# Patient Record
Sex: FEMALE | Race: WHITE | Employment: FULL TIME | URBAN - METROPOLITAN AREA
[De-identification: names, ages, dates, MRNs, and addresses within clinical notes are randomized per-mention and may not be internally consistent; named-entity substitution may affect disease eponyms.]

---

## 2020-12-10 RX ORDER — FLUTICASONE FUROATE AND VILANTEROL TRIFENATATE 200; 25 UG/1; UG/1
POWDER RESPIRATORY (INHALATION)
COMMUNITY
Start: 2020-11-16

## 2020-12-10 RX ORDER — LEVOTHYROXINE SODIUM 75 UG/1
TABLET ORAL
COMMUNITY
Start: 2020-10-16

## 2020-12-11 ENCOUNTER — OFFICE VISIT (OUTPATIENT)
Dept: NEUROLOGY | Age: 60
End: 2020-12-11
Payer: COMMERCIAL

## 2020-12-11 VITALS
HEART RATE: 70 BPM | DIASTOLIC BLOOD PRESSURE: 60 MMHG | RESPIRATION RATE: 16 BRPM | WEIGHT: 123 LBS | OXYGEN SATURATION: 99 % | TEMPERATURE: 97.9 F | SYSTOLIC BLOOD PRESSURE: 102 MMHG

## 2020-12-11 DIAGNOSIS — R29.898 WEAKNESS OF BOTH LEGS: Primary | ICD-10-CM

## 2020-12-11 PROCEDURE — 99204 OFFICE O/P NEW MOD 45 MIN: CPT | Performed by: PSYCHIATRY & NEUROLOGY

## 2020-12-11 NOTE — LETTER
12/11/20 Patient: Lucretia Davison YOB: 1960 Date of Visit: 12/11/2020 Tasha Hill MD 
[address] [place] [id number] VIA Facsimile: [phone] Dear Tasha Hill MD, Thank you for referring Ms. Lucretia Davison to [facility] for evaluation. My notes for this consultation are attached. If you have questions, please do not hesitate to call me. I look forward to following your patient along with you. Sincerely, Alexis Hernandez MD

## 2020-12-11 NOTE — PROGRESS NOTES
Chief Complaint   Patient presents with    New Patient     C/o bilateral leg pain, all day, alot of chevy horses. Also has had 3 falls and states she is basically tripping herself because her legs either give out or just feel heavy.      Visit Vitals  /60 (BP 1 Location: Right arm, BP Patient Position: Sitting)   Pulse 70   Temp 97.9 °F (36.6 °C)   Resp 16   Wt 55.8 kg (123 lb)   SpO2 99%

## 2020-12-11 NOTE — PROGRESS NOTES
NEUROLOGY NEW PATIENT OFFICE CONSULTATION      12/11/2020    RE: Andrew Rivas         1960      REFERRED BY:  Kassy Mancera MD        CHIEF COMPLAINT:  This is Andrew Rivas is a 64 y.o. female right handed works for [employer] who had concerns including New Patient (C/o bilateral leg pain, all day, alot of chevy horses. Also has had 3 falls and states she is basically tripping herself because her legs either give out or just feel heavy.). HPI:     For the past 1 yr, patient noted progressive bilateral leg pain and weakness all day, described as chevy horses or cramps of the calves and the feet, more so at night, will get tripped, legs feel tired, symmetric    (-) incontinence  (-) neck pain  (-) lower back pain  (-) numbness    Recent tests done:  Vit B12 203  CRP WNL  ESR 3  CAROLYN (-)  CBC and CMP WNL      ROS   (-) fever  (-) rash  All other systems reviewed and are negative    Past Medical Hx  Past Medical History:   Diagnosis Date    Asthma        Social Hx  Social History     Socioeconomic History    Marital status:      Spouse name: Not on file    Number of children: Not on file    Years of education: Not on file    Highest education level: Not on file   Tobacco Use    Smoking status: Never Smoker    Smokeless tobacco: Never Used       Family Hx  History reviewed. No pertinent family history. ALLERGIES  No Known Allergies    CURRENT MEDS  Current Outpatient Medications   Medication Sig Dispense Refill    Breo Ellipta 200-25 mcg/dose inhaler INL 1 PUFF PO D      levothyroxine (SYNTHROID) 75 mcg tablet              PREVIOUS WORKUP: (reviewed)  IMAGING:    CT Results (recent):  No results found for this or any previous visit. MRI Results (recent):  No results found for this or any previous visit. IR Results (recent):  No results found for this or any previous visit. VAS/US Results (recent):  No results found for this or any previous visit.         LABS (reviewed)  No results found for this or any previous visit. Physical Exam:     Visit Vitals  /60 (BP 1 Location: Right arm, BP Patient Position: Sitting)   Pulse 70   Temp 97.9 °F (36.6 °C)   Resp 16   Wt 55.8 kg (123 lb)   SpO2 99%     General:  Alert, cooperative, no distress. Head:  Normocephalic, without obvious abnormality, atraumatic. Eyes:  Conjunctivae/corneas clear. Lungs:  Heart:   Non labored breathing  Regular rate and rhythm, no carotid bruits   Abdomen:   Soft, non-distended   Extremities: Extremities normal, atraumatic, no cyanosis or edema. Pulses: 2+ and symmetric all extremities. Skin: Skin color, texture, turgor normal. No rashes or lesions. Neurologic Exam     Gen: Attention normal             Language: naming, repetition, fluency normal             Memory: intact recent and remote memory  Cranial Nerves:  I: smell Not tested   II: visual fields Full to confrontation   II: pupils Equal, round, reactive to light   II: optic disc No papilledema   III,VII: ptosis none   III,IV,VI: extraocular muscles  Full ROM   V: mastication normal   V: facial light touch sensation  normal   VII: facial muscle function   symmetric   VIII: hearing symmetric   IX: soft palate elevation  normal   XI: trapezius strength  5/5   XI: sternocleidomastoid strength 5/5   XI: neck flexion strength  5/5   XII: tongue  midline     Motor: normal bulk and tone, no tremor              Strength: 5/5 all four extremities  Sensory: intact to LT, PP, vibration, and JPS  Reflexes: 2+ throughout; Down going toes  Coordination: Good FTN and HTS  Gait: normal gait including tandem            Impression:     Gabriella Harrison is a 64 y.o. female who  has a past medical history of Asthma. who for the past 1 yr, noted progressive bilateral leg pain and weakness all day, described as chevy horses or cramps of the calves and the feet, more so at night, will get tripped, legs feel tired, symmetric.  Considerations include myelopathy due to Vit B12 deficiency (203) and spinal stenosis. RECOMMENDATIONS  1. I had a long discussion with patient. Discussed diagnosis, prognosis, pathophysiology and available treatment. Reviewed test results. All questions were answered. 2. Reviewed lab results from outside  3. Advise to talk to her PCP and start Vit B12 IM injection every day for 7 days, then Q weekly with goal to get Vit B12 level in the 900 to 1000 range and see if symptoms will improve  4. Patient will do MRI lumbar spine if still no improvement despite above  5. Depending on above, will consider EMG/NCS and physical therapy referral        Follow-up and Dispositions    · Return in about 1 month (around 1/11/2021).             Thank you for the consultation      Kamini Yo MD  Diplomate, American Board of Psychiatry and Neurology  Diplomate, Neuromuscular Medicine  Diplomate, American Board of Electrodiagnostic Medicine        CC: Fredi Keene MD  Fax: [phone]

## 2021-02-19 ENCOUNTER — OFFICE VISIT (OUTPATIENT)
Dept: NEUROLOGY | Age: 61
End: 2021-02-19
Payer: COMMERCIAL

## 2021-02-19 VITALS
DIASTOLIC BLOOD PRESSURE: 70 MMHG | TEMPERATURE: 98.7 F | RESPIRATION RATE: 16 BRPM | HEART RATE: 80 BPM | SYSTOLIC BLOOD PRESSURE: 132 MMHG | OXYGEN SATURATION: 99 % | WEIGHT: 123 LBS

## 2021-02-19 DIAGNOSIS — R29.898 WEAKNESS OF BOTH LEGS: Primary | ICD-10-CM

## 2021-02-19 PROCEDURE — 99214 OFFICE O/P EST MOD 30 MIN: CPT | Performed by: PSYCHIATRY & NEUROLOGY

## 2021-02-19 RX ORDER — CYANOCOBALAMIN 1000 UG/ML
INJECTION, SOLUTION INTRAMUSCULAR; SUBCUTANEOUS
COMMUNITY
Start: 2021-02-16

## 2021-02-19 NOTE — LETTER
2/19/2021    Patient: Robinson Barrios   YOB: 1960   Date of Visit: 2/19/2021     Judith Easley MD  [facility] [phone]  Via Fax: [phone]    Dear Judith Easley MD,      Thank you for referring Ms. Robinson Barrios to [facility] for evaluation. My notes for this consultation are attached. If you have questions, please do not hesitate to call me. I look forward to following your patient along with you.       Sincerely,    Charissa Peterson MD

## 2021-02-19 NOTE — PROGRESS NOTES
Neurology Progress Note    Patient ID:  Shilpa Granados  [id number]  3 y.o.  1960      Subjective:   History:  Shilpa Granados is a 64 y.o. female who  has a past medical history of Asthma. who for the past 1 yr, noted progressive bilateral leg pain and weakness all day, described as chevy horses or cramps of the calves and the feet, more so at night, will get tripped, legs feel tired, symmetric. Considerations include myelopathy due to Vit B12 deficiency (203) and spinal stenosis.     Had COVID in Dec and was not able to start Vit B12 until 1 month ago. Getting now weekly Vit B12 injections with 75% improvement with note of more energy and decrease chevy horse in both feet. ROS:  Per HPI-  Otherwise the remainder of ROS was negative    Social Hx  Social History     Socioeconomic History    Marital status:      Spouse name: Not on file    Number of children: Not on file    Years of education: Not on file    Highest education level: Not on file   Tobacco Use    Smoking status: Never Smoker    Smokeless tobacco: Never Used       Meds:  Current Outpatient Medications on File Prior to Visit   Medication Sig Dispense Refill    cyanocobalamin (VITAMIN B12) 1,000 mcg/mL injection       Breo Ellipta 200-25 mcg/dose inhaler INL 1 PUFF PO D      levothyroxine (SYNTHROID) 75 mcg tablet        No current facility-administered medications on file prior to visit. Imaging:    CT Results (recent):  No results found for this or any previous visit. MRI Results (recent):  No results found for this or any previous visit. IR Results (recent):  No results found for this or any previous visit. VAS/US Results (recent):  No results found for this or any previous visit. Reviewed records in [facility] tab today    Lab Review     No visits with results within 3 Month(s) from this visit. Latest known visit with results is:   No results found for any previous visit.          Objective: Exam:  Visit Vitals  /70 (BP 1 Location: Right arm, BP Patient Position: Sitting, BP Cuff Size: Adult)   Pulse 80   Temp 98.7 °F (37.1 °C) (Temporal)   Resp 16   Wt 55.8 kg (123 lb)   SpO2 99%     Gen: Awake, alert, follows commands  Appropriate appearance, normal speech. Oriented to all spheres. No visual field defect on confrontation exam.  Full eyes movement, with no nystagmus, no diplopia, no ptosis. Normal gag and swallow. All remaining cranial nerves were normal  Motor function: 5/5 in all extremities  Sensory: intact to LT, PP and JPS  DTRs ++ in all extremities, (-) Babinski  Good FTN and HTS   Gait: Normal    Assessment:       ICD-10-CM ICD-9-CM    1. Weakness of both legs  R29.898 729.89      Due to Vit B12 deficiency      Plan:   1. Since patient feels 75% improved, will monitor symptoms for now  2. Continue to keep Vit B12 levels in the high normal range (900-1000) c/o PCP. 3. Follow up Vit B12 levels  4. Will consider doing MRI lumbar spine and EMG/NCS depending on above      Follow-up and Dispositions    · Return if symptoms worsen or fail to improve.                Klever Guo MD  Diplomate, American Board of Psychiatry and Neurology  Diplomate, Neuromuscular Medicine  Diplomate, American Board of Electrodiagnostic Medicine

## 2022-02-22 ENCOUNTER — OFFICE VISIT (OUTPATIENT)
Dept: NEUROLOGY | Age: 62
End: 2022-02-22
Payer: COMMERCIAL

## 2022-02-22 VITALS
RESPIRATION RATE: 18 BRPM | WEIGHT: 118.7 LBS | OXYGEN SATURATION: 95 % | TEMPERATURE: 97 F | HEART RATE: 62 BPM | DIASTOLIC BLOOD PRESSURE: 72 MMHG | SYSTOLIC BLOOD PRESSURE: 110 MMHG

## 2022-02-22 DIAGNOSIS — M54.41 CHRONIC BILATERAL LOW BACK PAIN WITH BILATERAL SCIATICA: Primary | ICD-10-CM

## 2022-02-22 DIAGNOSIS — G89.29 CHRONIC BILATERAL LOW BACK PAIN WITH BILATERAL SCIATICA: Primary | ICD-10-CM

## 2022-02-22 DIAGNOSIS — M54.42 CHRONIC BILATERAL LOW BACK PAIN WITH BILATERAL SCIATICA: Primary | ICD-10-CM

## 2022-02-22 PROCEDURE — 99214 OFFICE O/P EST MOD 30 MIN: CPT | Performed by: PSYCHIATRY & NEUROLOGY

## 2022-02-22 NOTE — PROGRESS NOTES
Chief Complaint   Patient presents with    Follow-up     Follow up leg cramping and weakness; notes cramping mostly at night     Visit Vitals  /72 (BP 1 Location: Right arm, BP Patient Position: Sitting, BP Cuff Size: Adult)   Pulse 62   Temp 97 °F (36.1 °C) (Temporal)   Resp 18   Wt 53.8 kg (118 lb 11.2 oz)   SpO2 95%

## 2022-02-22 NOTE — LETTER
2/22/2022    Patient: Junie Cunningham   YOB: 1960   Date of Visit: 2/22/2022     Singh Javier MD  [facility] [phone]  Via Fax: [phone]    Dear Singh Javier MD,      Thank you for referring Ms. Junie Cunningham to [facility] for evaluation. My notes for this consultation are attached. If you have questions, please do not hesitate to call me. I look forward to following your patient along with you.       Sincerely,    Tae Lorenz MD

## 2022-02-22 NOTE — PROGRESS NOTES
Neurology Progress Note    Patient ID:  Jess Youssef  [id number]  48 y.o.  1960      Subjective:   History:  Hever Hsu a 64 y. o. female who  has a past medical history of Asthma. who for the past 1 yr, noted progressive bilateral leg pain and weakness all day, described as chevy horses or cramps of the calves and the feet, more so at night, will get tripped, legs feel tired, symmetric. Considerations include myelopathy due to Vit B12 deficiency (203) and spinal stenosis.       Patient last seen Feb 2021. Patient was doing well for a while on Vit B12. However, for the past 2 months, patient noted bilateral calf muscle cramping worse at night. (+) chronic lower back pain, radiating to legs. (+) patchy warm numbness of the R lateral leg. Recent blood test (2/18/22)    TSH WNL  Vit B12 1109  ESR 3  Mg 2.2          ROS:  Per HPI-  Otherwise the remainder of ROS was negative    Social Hx  Social History     Socioeconomic History    Marital status:    Tobacco Use    Smoking status: Never Smoker    Smokeless tobacco: Never Used   Vaping Use    Vaping Use: Never used       Meds:  Current Outpatient Medications on File Prior to Visit   Medication Sig Dispense Refill    cyanocobalamin (VITAMIN B12) 1,000 mcg/mL injection       Breo Ellipta 200-25 mcg/dose inhaler INL 1 PUFF PO D      levothyroxine (SYNTHROID) 75 mcg tablet        No current facility-administered medications on file prior to visit. Imaging:    CT Results (recent):  No results found for this or any previous visit. MRI Results (recent):  No results found for this or any previous visit. IR Results (recent):  No results found for this or any previous visit. VAS/US Results (recent):  No results found for this or any previous visit. Reviewed records in [facility] tab today    Lab Review     No visits with results within 3 Month(s) from this visit.    Latest known visit with results is: No results found for any previous visit. Objective:       Exam:  Visit Vitals  /72 (BP 1 Location: Right arm, BP Patient Position: Sitting, BP Cuff Size: Adult)   Pulse 62   Temp 97 °F (36.1 °C) (Temporal)   Resp 18   Wt 53.8 kg (118 lb 11.2 oz)   SpO2 95%     Gen: Awake, alert, follows commands  Appropriate appearance, normal speech. Oriented to all spheres. No visual field defect on confrontation exam.  Full eyes movement, with no nystagmus, no diplopia, no ptosis. Normal gag and swallow. All remaining cranial nerves were normal  Motor function: 5/5 in all extremities  Sensory: intact to LT, PP and JPS  DTRs ++ in all extremities, (-) Babinski  Good FTN and HTS   Gait: Normal    Assessment:       ICD-10-CM ICD-9-CM    1. Chronic bilateral low back pain with bilateral sciatica  M54.42 724.2 MRI LUMB SPINE WO CONT    M54.41 724.3 EMG LIMITED    G89.29 338.29      Evaluate for lumbar spinal stenosis    Vit B12 deficiency    Plan:   1. Due to worsening symptoms, will order MRI lumbar spine to look for evidence of lumbar spinal stenosis  2. EMG/NCS of both LE with radiculopathy protocol  3. Continue Vit B12 supplement c/o PCP      Follow-up and Dispositions    · Return for above testing.                Og Lopez MD  Diplomate, American Board of Psychiatry and Neurology  Diplomate, Neuromuscular Medicine  Diplomate, American Board of Electrodiagnostic Medicine

## 2022-03-16 ENCOUNTER — HOSPITAL ENCOUNTER (OUTPATIENT)
Dept: MRI IMAGING | Age: 62
Discharge: HOME | End: 2022-03-16
Attending: PSYCHIATRY & NEUROLOGY
Payer: COMMERCIAL

## 2022-03-16 DIAGNOSIS — G89.29 CHRONIC BILATERAL LOW BACK PAIN WITH BILATERAL SCIATICA: ICD-10-CM

## 2022-03-16 DIAGNOSIS — M54.42 CHRONIC BILATERAL LOW BACK PAIN WITH BILATERAL SCIATICA: ICD-10-CM

## 2022-03-16 DIAGNOSIS — M54.41 CHRONIC BILATERAL LOW BACK PAIN WITH BILATERAL SCIATICA: ICD-10-CM

## 2022-03-16 PROCEDURE — 72148 MRI LUMBAR SPINE W/O DYE: CPT

## 2022-03-25 ENCOUNTER — OFFICE VISIT (OUTPATIENT)
Dept: NEUROLOGY | Age: 62
End: 2022-03-25
Payer: COMMERCIAL

## 2022-03-25 DIAGNOSIS — M54.41 CHRONIC BILATERAL LOW BACK PAIN WITH BILATERAL SCIATICA: ICD-10-CM

## 2022-03-25 DIAGNOSIS — R29.898 WEAKNESS OF BOTH LEGS: Primary | ICD-10-CM

## 2022-03-25 DIAGNOSIS — M54.42 CHRONIC BILATERAL LOW BACK PAIN WITH BILATERAL SCIATICA: ICD-10-CM

## 2022-03-25 DIAGNOSIS — G89.29 CHRONIC BILATERAL LOW BACK PAIN WITH BILATERAL SCIATICA: ICD-10-CM

## 2022-03-25 PROCEDURE — 95911 NRV CNDJ TEST 9-10 STUDIES: CPT | Performed by: PSYCHIATRY & NEUROLOGY

## 2022-03-25 PROCEDURE — 99213 OFFICE O/P EST LOW 20 MIN: CPT | Performed by: PSYCHIATRY & NEUROLOGY

## 2022-03-25 PROCEDURE — 95886 MUSC TEST DONE W/N TEST COMP: CPT | Performed by: PSYCHIATRY & NEUROLOGY

## 2022-03-25 NOTE — LETTER
3/25/2022 6:12 PM    Patient:  Junie Cunningham   YOB: 1960  Date of Visit: 3/25/2022      Dear MD [address] Messer [phone]  Via Fax: [phone]: Thank you for referring Ms. Junie Cunningham to me for EMG/NCS. EMG/ NCS Report  [facility]  - [facility]  [address] [place]   Ph: [phone]   FAX: [phone]/ [phone]  Test Date:  2019      Test Date:  3/25/2022    Patient: Christine Bang : 1960 Physician: Veronica Kunz MD   Sex: Female Height: ' \" Ref Phys: Ofe Ross MD   ID#: [id number] Weight:  lbs. Technician: Mckayla Li     Patient History / Exam:  Patient is coming for neuropathy evaluation. Rock Eric a 64 y. o. female who  has a past medical history of Asthma. who for the past 1 yr, noted progressive bilateral leg pain and weakness all day, described as chevy horses or cramps of the calves and the feet, more so at night, will get tripped, legs feel tired, symmetric. Considerations include myelopathy due to Vit B12 deficiency (203) and spinal stenosis.      (+) chronic lower back pain, radiating to legs.     (+) patchy warm numbness of the R lateral leg. Exam: Patient awake, alert, follows commands, clear speech; hearing grossly intact; EOMI, (-) facial asymmetry, tongue midline; Motor: 5/5 in all extremities; Sensory: intact to LT, PP, JPS; DTRs ++; Good FTN and HTS; Gait: stable        EMG & NCV Findings:  Evaluation of the left Fibular motor nerve showed normal distal onset latency (4.5 ms), normal amplitude (3.0 mV), normal conduction velocity (B Fib-Ankle, 48 m/s), and normal conduction velocity (Poplt-B Fib, 59 m/s). The right Fibular motor nerve showed normal distal onset latency (3.9 ms), normal amplitude (3.1 mV), and normal conduction velocity (B Fib-Ankle, 48 m/s).   The left tibial motor and the right tibial motor nerves showed normal distal onset latency (L4.8, R4.7 ms), normal amplitude (L17.9, R16.5 mV), and normal conduction velocity (Knee-Ankle, L42, R48 m/s). The left Sup Fibular sensory, the right Sup Fibular sensory, the left sural sensory, and the right sural sensory nerves showed normal distal peak latency (L2.3, R2.5, L3.4, R3.3 ms) and normal amplitude (L24.3, R22.4, L15.4, R11.9 µV). All F Wave latencies were within normal limits. All F Wave left vs. right side latency differences were within normal limits. All H Reflex left vs. right side latency differences were within normal limits. All examined muscles (as indicated in the following table) showed no evidence of electrical instability. Impression:      Extensive electrodiagnostic examination of the right and left lower extremities is normal.    Specifically, there is no evidence of a peripheral neuropathy or lumbosacral motor radiculopathy.         Kelly Knox MD  Diplomate, American Board of Psychiatry and Neurology  Diplomate, Neuromuscular Medicine  Diplomate, American Board of Electrodiagnostic Medicine  Director, [address] [place] [address] Accredited Laboratory with Exemplary Status          Nerve Conduction Studies  Anti Sensory Summary Table     Stim Site NR Peak (ms) Norm Peak (ms) P-T Amp (µV) Norm P-T Amp Site1 Site2 Dist (cm)   Left Sup Fibular Anti Sensory (Lat ankle)  28.5 °C   Lower leg    2.3 <4.5 24.3 >5 Lower leg Lat ankle 10.0   Right Sup Fibular Anti Sensory (Lat ankle)  30.6 °C   Lower leg    2.5 <4.5 22.4 >5 Lower leg Lat ankle 10.0   Left Sural Anti Sensory (Lat Mall)  29.1 °C   Calf    3.4 <4.5 15.4 >4.0 Calf Lat Mall 14.0   Right Sural Anti Sensory (Lat Mall)  31.1 °C   Calf    3.3 <4.5 11.9 >4.0 Calf Lat Mall 14.0     Motor Summary Table     Stim Site NR Onset (ms) Norm Onset (ms) O-P Amp (mV) Norm O-P Amp Amp (Prev) (%) Site1 Site2 Dist (cm) Hector (m/s) Norm Hector (m/s)   Left Fibular Motor (Ext Dig Brev)  28.4 °C   Ankle    4.5 <6.5 3.0 >2.6 100.0 Ankle Ext Dig Brev 8.0     B Fib    11.1  2.8  93.3 B Fib Ankle 32.0 48 >38   Poplt    12.8  2.5  89.3 Poplt B Fib 10.0 59 >42   Right Fibular Motor (Ext Dig Brev)  30.2 °C   Ankle    3.9 <6.5 3.1 >2.6 100.0 Ankle Ext Dig Brev 8.0     B Fib    10.4  2.8  90.3 B Fib Ankle 31.0 48 >38   Left Tibial Motor (Abd Lugo Brev)  28.1 °C   Ankle    4.8 <6.1 17.9 >5.3 100.0 Ankle Abd Lugo Brev 8.0     Knee    13.2  15.6  87.2 Knee Ankle 35.0 42 >39   Right Tibial Motor (Abd Lugo Brev)  29.8 °C   Ankle    4.7 <6.1 16.5 >5.3 100.0 Ankle Abd Lugo Brev 8.0     Knee    12.6  11.4  69.1 Knee Ankle 38.0 48 >39     F Wave Studies     NR F-Lat (ms) Lat Norm (ms) L-R F-Lat (ms) L-R Lat Norm   Left Tibial (Mrkrs) (Abd Hallucis)  28 °C      54.15 <56 0.23 <5.7   Right Tibial (Mrkrs) (Abd Hallucis)  29.4 °C      53.92 <56 0.23 <5.7     H Reflex Studies     NR H-Lat (ms) L-R H-Lat (ms) L-R Lat Norm   Left Tibial (Gastroc)  27.9 °C      36.25 0.00 <2.0   Right Tibial (Gastroc)  29.2 °C      36.25 0.00 <2.0     EMG     Side Muscle Nerve Root Ins Act Fibs Psw Recrt Duration Amp Poly Comment   Left Ext Dig Brev Dp Br Peron L5, S1 Nml Nml Nml Nml Nml Nml Nml    Left AbdHallucis MedPlantar S1-2 Nml Nml Nml Nml Nml Nml Nml    Left AntTibialis Dp Br Peron L4-5 Nml Nml Nml Nml Nml Nml Nml    Left MedGastroc Tibial S1-2 Nml Nml Nml Nml Nml Nml Nml    Left VastusLat Femoral L2-4 Nml Nml Nml Nml Nml Nml Nml    Right Ext Dig Brev Dp Br Peron L5, S1 Nml Nml Nml Nml Nml Nml Nml    Right AbdHallucis MedPlantar S1-2 Nml Nml Nml Nml Nml Nml Nml    Right AntTibialis Dp Br Peron L4-5 Nml Nml Nml Nml Nml Nml Nml    Right MedGastroc Tibial S1-2 Nml Nml Nml Nml Nml Nml Nml    Right VastusLat Femoral L2-4 Nml Nml Nml Nml Nml Nml Nml    Right GluteusMed SupGluteal L4-S1 Nml Nml Nml Nml Nml Nml Nml    Right Lower Lumb Parasp Rami L5,S1 Nml Nml Nml Nml Nml Nml Nml                Nerve Conduction Studies  Anti Sensory Left/Right Comparison     Stim Site L Lat (ms) R Lat (ms) L-R Lat (ms) L Amp (µV) R Amp (µV) L-R Amp (%) Site1 Site2 L Hector (m/s) R Hector (m/s) L-R Hector (m/s)   Sup Fibular Anti Sensory (Lat ankle)  28.5 °C   Lower leg 1.8 1.8 0.0 24.3 22.4 7.8 Lower leg Lat ankle 56 56 0   Sural Anti Sensory (Lat Mall)  29.1 °C   Calf 2.8 2.8 0.0 15.4 11.9 22.7 Calf Lat Mall 50 50 0     Motor Left/Right Comparison     Stim Site L Lat (ms) R Lat (ms) L-R Lat (ms) L Amp (mV) R Amp (mV) L-R Amp (%) Site1 Site2 L Hector (m/s) R Hector (m/s) L-R Hector (m/s)   Fibular Motor (Ext Dig Brev)  28.4 °C   Ankle 4.5 3.9 0.6 3.0 3.1 3.2 Ankle Ext Dig Brev      B Fib 11.1 10.4 0.7 2.8 2.8 0.0 B Fib Ankle 48 48 0   Poplt 12.8   2.5   Poplt B Fib 59     Tibial Motor (Abd Lugo Brev)  28.1 °C   Ankle 4.8 4.7 0.1 17.9 16.5 7.8 Ankle Abd Lugo Brev      Knee 13.2 12.6 0.6 15.6 11.4 26.9 Knee Ankle 42 48 6         Waveforms:

## 2022-03-25 NOTE — PROGRESS NOTES
EMG/NCS done. See Procedure Note for results. Discussed EMG/NCS of both LE is normal    MRI lumbar spine only showed mild disc disease    A> Muscle cramps    P> Trial of Mg supplement and CoQ10  Discussed doing MRI cervical and thoracic spine.  Patient declined for now      Follow up as needed    Bailey Todd MD

## 2022-03-25 NOTE — PROCEDURES
EMG/ NCS Report  [facility]  - [facility]  [facility], [place]  [facility], [address] [place], [address]   Ph: [phone]   FAX: [phone]/ [phone]  Test Date:  2019      Test Date:  3/25/2022    Patient: Eladia Naik : 1960 Physician: Chad Newby MD   Sex: Female Height: ' \" Ref Phys: Colby Guillen MD   ID#: [id number] Weight:  lbs. Technician: Terrance Rodriguez     Patient History / Exam:  Patient is coming for neuropathy evaluation. Silvia Alegria a 64 y. o. female who  has a past medical history of Asthma. who for the past 1 yr, noted progressive bilateral leg pain and weakness all day, described as chevy horses or cramps of the calves and the feet, more so at night, will get tripped, legs feel tired, symmetric. Considerations include myelopathy due to Vit B12 deficiency (203) and spinal stenosis.      (+) chronic lower back pain, radiating to legs.     (+) patchy warm numbness of the R lateral leg. Exam: Patient awake, alert, follows commands, clear speech; hearing grossly intact; EOMI, (-) facial asymmetry, tongue midline; Motor: 5/5 in all extremities; Sensory: intact to LT, PP, JPS; DTRs ++; Good FTN and HTS; Gait: stable        EMG & NCV Findings:  Evaluation of the left Fibular motor nerve showed normal distal onset latency (4.5 ms), normal amplitude (3.0 mV), normal conduction velocity (B Fib-Ankle, 48 m/s), and normal conduction velocity (Poplt-B Fib, 59 m/s). The right Fibular motor nerve showed normal distal onset latency (3.9 ms), normal amplitude (3.1 mV), and normal conduction velocity (B Fib-Ankle, 48 m/s). The left tibial motor and the right tibial motor nerves showed normal distal onset latency (L4.8, R4.7 ms), normal amplitude (L17.9, R16.5 mV), and normal conduction velocity (Knee-Ankle, L42, R48 m/s).   The left Sup Fibular sensory, the right Sup Fibular sensory, the left sural sensory, and the right sural sensory nerves showed normal distal peak latency (L2.3, R2.5, L3.4, R3.3 ms) and normal amplitude (L24.3, R22.4, L15.4, R11.9 µV). All F Wave latencies were within normal limits. All F Wave left vs. right side latency differences were within normal limits. All H Reflex left vs. right side latency differences were within normal limits. All examined muscles (as indicated in the following table) showed no evidence of electrical instability. Impression:      Extensive electrodiagnostic examination of the right and left lower extremities is normal.    Specifically, there is no evidence of a peripheral neuropathy or lumbosacral motor radiculopathy.         Roland Styles MD  Diplomate, American Board of Psychiatry and Neurology  Diplomate, Neuromuscular Medicine  Diplomate, American Board of Electrodiagnostic Medicine  Director, [address] [place] [address] Accredited Laboratory with Exemplary Status          Nerve Conduction Studies  Anti Sensory Summary Table     Stim Site NR Peak (ms) Norm Peak (ms) P-T Amp (µV) Norm P-T Amp Site1 Site2 Dist (cm)   Left Sup Fibular Anti Sensory (Lat ankle)  28.5 °C   Lower leg    2.3 <4.5 24.3 >5 Lower leg Lat ankle 10.0   Right Sup Fibular Anti Sensory (Lat ankle)  30.6 °C   Lower leg    2.5 <4.5 22.4 >5 Lower leg Lat ankle 10.0   Left Sural Anti Sensory (Lat Mall)  29.1 °C   Calf    3.4 <4.5 15.4 >4.0 Calf Lat Mall 14.0   Right Sural Anti Sensory (Lat Mall)  31.1 °C   Calf    3.3 <4.5 11.9 >4.0 Calf Lat Mall 14.0     Motor Summary Table     Stim Site NR Onset (ms) Norm Onset (ms) O-P Amp (mV) Norm O-P Amp Amp (Prev) (%) Site1 Site2 Dist (cm) Hector (m/s) Norm Hector (m/s)   Left Fibular Motor (Ext Dig Brev)  28.4 °C   Ankle    4.5 <6.5 3.0 >2.6 100.0 Ankle Ext Dig Brev 8.0     B Fib    11.1  2.8  93.3 B Fib Ankle 32.0 48 >38   Poplt    12.8  2.5  89.3 Poplt B Fib 10.0 59 >42   Right Fibular Motor (Ext Dig Brev)  30.2 °C   Ankle    3.9 <6.5 3.1 >2.6 100.0 Ankle Ext Dig Brev 8.0     B Fib    10.4  2.8  90.3 B Fib Ankle 31.0 48 >38   Left Tibial Motor (Abd Lugo Brev)  28.1 °C   Ankle    4.8 <6.1 17.9 >5.3 100.0 Ankle Abd Lugo Brev 8.0     Knee    13.2  15.6  87.2 Knee Ankle 35.0 42 >39   Right Tibial Motor (Abd Lugo Brev)  29.8 °C   Ankle    4.7 <6.1 16.5 >5.3 100.0 Ankle Abd Lugo Brev 8.0     Knee    12.6  11.4  69.1 Knee Ankle 38.0 48 >39     F Wave Studies     NR F-Lat (ms) Lat Norm (ms) L-R F-Lat (ms) L-R Lat Norm   Left Tibial (Mrkrs) (Abd Hallucis)  28 °C      54.15 <56 0.23 <5.7   Right Tibial (Mrkrs) (Abd Hallucis)  29.4 °C      53.92 <56 0.23 <5.7     H Reflex Studies     NR H-Lat (ms) L-R H-Lat (ms) L-R Lat Norm   Left Tibial (Gastroc)  27.9 °C      36.25 0.00 <2.0   Right Tibial (Gastroc)  29.2 °C      36.25 0.00 <2.0     EMG     Side Muscle Nerve Root Ins Act Fibs Psw Recrt Duration Amp Poly Comment   Left Ext Dig Brev Dp Br Peron L5, S1 Nml Nml Nml Nml Nml Nml Nml    Left AbdHallucis MedPlantar S1-2 Nml Nml Nml Nml Nml Nml Nml    Left AntTibialis Dp Br Peron L4-5 Nml Nml Nml Nml Nml Nml Nml    Left MedGastroc Tibial S1-2 Nml Nml Nml Nml Nml Nml Nml    Left VastusLat Femoral L2-4 Nml Nml Nml Nml Nml Nml Nml    Right Ext Dig Brev Dp Br Peron L5, S1 Nml Nml Nml Nml Nml Nml Nml    Right AbdHallucis MedPlantar S1-2 Nml Nml Nml Nml Nml Nml Nml    Right AntTibialis Dp Br Peron L4-5 Nml Nml Nml Nml Nml Nml Nml    Right MedGastroc Tibial S1-2 Nml Nml Nml Nml Nml Nml Nml    Right VastusLat Femoral L2-4 Nml Nml Nml Nml Nml Nml Nml    Right GluteusMed SupGluteal L4-S1 Nml Nml Nml Nml Nml Nml Nml    Right Lower Lumb Parasp Rami L5,S1 Nml Nml Nml Nml Nml Nml Nml                Nerve Conduction Studies  Anti Sensory Left/Right Comparison     Stim Site L Lat (ms) R Lat (ms) L-R Lat (ms) L Amp (µV) R Amp (µV) L-R Amp (%) Site1 Site2 L Hector (m/s) R Hector (m/s) L-R Hector (m/s)   Sup Fibular Anti Sensory (Lat ankle)  28.5 °C   Lower leg 1.8 1.8 0.0 24.3 22.4 7.8 Lower leg Lat ankle 56 56 0   Sural Anti Sensory (Lat Mall)  29.1 °C   Calf 2.8 2.8 0.0 15.4 11.9 22.7 Calf Lat Mall 50 50 0     Motor Left/Right Comparison     Stim Site L Lat (ms) R Lat (ms) L-R Lat (ms) L Amp (mV) R Amp (mV) L-R Amp (%) Site1 Site2 L Hector (m/s) R Hector (m/s) L-R Hector (m/s)   Fibular Motor (Ext Dig Brev)  28.4 °C   Ankle 4.5 3.9 0.6 3.0 3.1 3.2 Ankle Ext Dig Brev      B Fib 11.1 10.4 0.7 2.8 2.8 0.0 B Fib Ankle 48 48 0   Poplt 12.8   2.5   Poplt B Fib 59     Tibial Motor (Abd Lugo Brev)  28.1 °C   Ankle 4.8 4.7 0.1 17.9 16.5 7.8 Ankle Abd Lugo Brev      Knee 13.2 12.6 0.6 15.6 11.4 26.9 Knee Ankle 42 48 6         Waveforms: